# Patient Record
(demographics unavailable — no encounter records)

---

## 2024-12-10 NOTE — ASSESSMENT
[FreeTextEntry1] : This is a self-referred gentleman for evaluation of a right groin swelling/hernia.  Patient was initially evaluated 3 to 4 years ago for a right inguinal hernia.  At that time he was diagnosed with having a right hernia and was told he should undergo repair.  The patient subsequently had a trip planned to [facility] went to [facility] and upon his return he had no issues with the hernia and therefore he did not wish to undergo a hernia repair.  He now presents to the office for reevaluation of this lump in his right groin.  Again the patient states the cause him no discomfort but now he wants to undergo repair.  Patient states the lump is approximately the same size as it and has been several years ago.  He has had no bouts of sharp pain he has had no nausea no vomiting has had no change in bowel habits.  He also under went a CAT scan of the abdomen pelvis for other issues in May of this year the CT demonstrated no obvious inguinal hernias.  Physical examination: Patient examined erect and supine position.  In the erect position under Valsalva maneuver or coughing he is noted to have a right inguinal hernia that is palpable through the external ring.  The odd thing with his exam is that when the patient is standing in the erect position there is a fullness/lump noted in the upper right groin where 1 would expect the internal ring to be.  This is less prominent in the supine position.  No contralateral finding.  He has a right lower paramedian scar secondary to an old appendectomy done as a child.  He is also status post laparoscopic cholecystectomy (me)  Impression/plan right inguinal hernia: This is a 72-year-old gentleman with a known right inguinal hernia for several years.  Patient states he has a lump in the right groin and that lump corresponds to a physical finding in the upper right groin where 1 would expect the internal ring.  The palpable finding within right inguinal hernia is through the external ring.  I am not sure what this obvious lump in the right groin is it could be a lipoma or could just be a incarcerated fat/lipoma at the level of his internal ring.  In any event a right groin will be explored at the time of surgery along with a right inguinal hernia repair with mesh.  The indications alternatives and complication of the procedure discussed questions answered written consent obtained in the office today.